# Patient Record
Sex: MALE | Race: BLACK OR AFRICAN AMERICAN | NOT HISPANIC OR LATINO | Employment: OTHER | URBAN - METROPOLITAN AREA
[De-identification: names, ages, dates, MRNs, and addresses within clinical notes are randomized per-mention and may not be internally consistent; named-entity substitution may affect disease eponyms.]

---

## 2023-04-05 ENCOUNTER — NURSING HOME VISIT (OUTPATIENT)
Dept: POST ACUTE CARE | Facility: EXTERNAL LOCATION | Age: 68
End: 2023-04-05
Payer: MEDICARE

## 2023-04-05 DIAGNOSIS — I10 PRIMARY HYPERTENSION: ICD-10-CM

## 2023-04-05 DIAGNOSIS — J42 CHRONIC BRONCHITIS, UNSPECIFIED CHRONIC BRONCHITIS TYPE (MULTI): Primary | ICD-10-CM

## 2023-04-05 DIAGNOSIS — F25.9 SCHIZO AFFECTIVE SCHIZOPHRENIA (MULTI): ICD-10-CM

## 2023-04-05 DIAGNOSIS — G40.909 NONINTRACTABLE EPILEPSY WITHOUT STATUS EPILEPTICUS, UNSPECIFIED EPILEPSY TYPE (MULTI): ICD-10-CM

## 2023-04-05 PROBLEM — R76.8 HEPATITIS C ANTIBODY POSITIVE IN BLOOD: Status: ACTIVE | Noted: 2023-04-05

## 2023-04-05 PROBLEM — D12.6 ADENOMATOUS COLON POLYP: Status: ACTIVE | Noted: 2023-04-05

## 2023-04-05 PROCEDURE — 99308 SBSQ NF CARE LOW MDM 20: CPT | Performed by: INTERNAL MEDICINE

## 2023-04-05 NOTE — PROGRESS NOTES
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 He is sitting in dining room.  ROS- denies fever/chills, ha/dizziness, chest pain, denies wheezing, abdominal pain, or extremity edema  No recent falls  Objective:  VS:/69 T 97.2 P 75 Wt 205 lbs  Gen NAD, pleasant  Neck: no jvd noted  CV: s1 s2, RRR  Pulmonary: CTA B/L  GI: soft, non tender BS +  Psych/Neuro: alert and oriented, pleasant and cooperative  Assessment/Plan-  Hypertension  -chronic, stable  Copd, chronic, stable  c/w symbicort  c/w albuterol as needed  BPH  Epilepsy- no recent seizure activity  c/w Depakote  continue supportive care  Schizophrenia- stable  c/w Risperdal, divalproex  c/w Haldol prn  F/U with Psych  c/w supportive care

## 2023-04-05 NOTE — LETTER
Patient: Dhruv Elizabeth  : 1955    Encounter Date: 2023    Subjective- Resident seen for routine visit. He is sitting in dining room.  ROS- denies fever/chills, ha/dizziness, chest pain, denies wheezing, abdominal pain, or extremity edema  No recent falls  Objective:  VS:/69 T 97.2 P 75 Wt 205 lbs  Gen NAD, pleasant  Neck: no jvd noted  CV: s1 s2, RRR  Pulmonary: CTA B/L  GI: soft, non tender BS +  Psych/Neuro: alert and oriented, pleasant and cooperative  Assessment/Plan-  Hypertension  -chronic, stable  Copd, chronic, stable  c/w symbicort  c/w albuterol as needed  BPH  Epilepsy- no recent seizure activity  c/w Depakote  continue supportive care  Schizophrenia- stable  c/w Risperdal, divalproex  c/w Haldol prn  F/U with Psych  c/w supportive care      Electronically Signed By: Jesica Villalpando MD   23 11:54 AM

## 2023-05-31 ENCOUNTER — NURSING HOME VISIT (OUTPATIENT)
Dept: POST ACUTE CARE | Facility: EXTERNAL LOCATION | Age: 68
End: 2023-05-31
Payer: MEDICARE

## 2023-05-31 DIAGNOSIS — I10 PRIMARY HYPERTENSION: ICD-10-CM

## 2023-05-31 DIAGNOSIS — F25.9 SCHIZO AFFECTIVE SCHIZOPHRENIA (MULTI): Primary | ICD-10-CM

## 2023-05-31 DIAGNOSIS — G40.802 OTHER EPILEPSY WITHOUT STATUS EPILEPTICUS, NOT INTRACTABLE (MULTI): ICD-10-CM

## 2023-05-31 PROCEDURE — 99307 SBSQ NF CARE SF MDM 10: CPT | Performed by: INTERNAL MEDICINE

## 2023-05-31 NOTE — LETTER
Patient: Dhruv Elizabeth  : 1955    Encounter Date: 2023    Subjective- Resident seen for routine visit. He is sitting in dining room.  ROS- denies fever/chills, ha/dizziness, chest pain, denies wheezing, abdominal pain, or extremity edema  No recent falls  Objective:  VS:/84 T 98.5 P 88 Wt 206.1 lbs  Gen NAD, pleasant  Neck: no jvd noted  CV: s1 s2, RRR  Pulmonary: CTA B/L  GI: soft, non tender BS +  Psych/Neuro: alert and oriented, pleasant and cooperative  Assessment/Plan-  Hypertension  -chronic, stable  Copd, chronic, stable  c/w symbicort  c/w albuterol as needed  BPH  Epilepsy- no recent seizure activity  c/w Depakote  continue supportive care  Schizophrenia- stable  c/w Risperdal, divalproex  c/w Haldol prn  F/U with Psych  c/w supportive care      Electronically Signed By: Jesica Villalpando MD   23  8:43 PM

## 2023-06-01 NOTE — PROGRESS NOTES
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 He is sitting in dining room.  ROS- denies fever/chills, ha/dizziness, chest pain, denies wheezing, abdominal pain, or extremity edema  No recent falls  Objective:  VS:/84 T 98.5 P 88 Wt 206.1 lbs  Gen NAD, pleasant  Neck: no jvd noted  CV: s1 s2, RRR  Pulmonary: CTA B/L  GI: soft, non tender BS +  Psych/Neuro: alert and oriented, pleasant and cooperative  Assessment/Plan-  Hypertension  -chronic, stable  Copd, chronic, stable  c/w symbicort  c/w albuterol as needed  BPH  Epilepsy- no recent seizure activity  c/w Depakote  continue supportive care  Schizophrenia- stable  c/w Risperdal, divalproex  c/w Haldol prn  F/U with Psych  c/w supportive care

## 2023-06-28 ENCOUNTER — NURSING HOME VISIT (OUTPATIENT)
Dept: POST ACUTE CARE | Facility: EXTERNAL LOCATION | Age: 68
End: 2023-06-28
Payer: MEDICARE

## 2023-06-28 DIAGNOSIS — I10 PRIMARY HYPERTENSION: Primary | ICD-10-CM

## 2023-06-28 DIAGNOSIS — F25.9 SCHIZO AFFECTIVE SCHIZOPHRENIA (MULTI): ICD-10-CM

## 2023-06-28 DIAGNOSIS — G40.009 PARTIAL IDIOPATHIC EPILEPSY WITH SEIZURES OF LOCALIZED ONSET, NOT INTRACTABLE, WITHOUT STATUS EPILEPTICUS (MULTI): ICD-10-CM

## 2023-06-28 PROCEDURE — 99308 SBSQ NF CARE LOW MDM 20: CPT | Performed by: INTERNAL MEDICINE

## 2023-06-28 NOTE — LETTER
Patient: Dhruv Elizabeth  : 1955    Encounter Date: 2023    Subjective- Resident seen for routine visit. He is sitting in dining room.  ROS- denies fever/chills, ha/dizziness, chest pain, denies wheezing, abdominal pain, or extremity edema  No recent falls  Objective:  VS:/75 T 98 P 79 Wt 207.1 lbs  Gen NAD, pleasant  Neck: no jvd noted  CV: s1 s2, RRR  Pulmonary: CTA B/L  GI: soft, non tender BS +  Psych/Neuro: alert and oriented, pleasant and cooperative  Assessment/Plan-  Hypertension  -chronic, stable  Copd, chronic, stable  c/w symbicort  c/w albuterol as needed  BPH  Epilepsy- no recent seizure activity  c/w Depakote  continue supportive care  Schizophrenia- stable  c/w Risperdal, divalproex  c/w Haldol prn  F/U with Psych  c/w supportive care      Electronically Signed By: Jesica Villalpando MD   23  8:11 PM

## 2023-06-29 NOTE — PROGRESS NOTES
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 He is sitting in dining room.  ROS- denies fever/chills, ha/dizziness, chest pain, denies wheezing, abdominal pain, or extremity edema  No recent falls  Objective:  VS:/75 T 98 P 79 Wt 207.1 lbs  Gen NAD, pleasant  Neck: no jvd noted  CV: s1 s2, RRR  Pulmonary: CTA B/L  GI: soft, non tender BS +  Psych/Neuro: alert and oriented, pleasant and cooperative  Assessment/Plan-  Hypertension  -chronic, stable  Copd, chronic, stable  c/w symbicort  c/w albuterol as needed  BPH  Epilepsy- no recent seizure activity  c/w Depakote  continue supportive care  Schizophrenia- stable  c/w Risperdal, divalproex  c/w Haldol prn  F/U with Psych  c/w supportive care

## 2023-08-16 ENCOUNTER — NURSING HOME VISIT (OUTPATIENT)
Dept: POST ACUTE CARE | Facility: EXTERNAL LOCATION | Age: 68
End: 2023-08-16
Payer: MEDICARE

## 2023-08-16 DIAGNOSIS — F25.9 SCHIZO AFFECTIVE SCHIZOPHRENIA (MULTI): ICD-10-CM

## 2023-08-16 DIAGNOSIS — N40.0 BENIGN PROSTATIC HYPERPLASIA WITHOUT LOWER URINARY TRACT SYMPTOMS: ICD-10-CM

## 2023-08-16 DIAGNOSIS — I10 PRIMARY HYPERTENSION: Primary | ICD-10-CM

## 2023-08-16 DIAGNOSIS — G40.009 PARTIAL IDIOPATHIC EPILEPSY WITH SEIZURES OF LOCALIZED ONSET, NOT INTRACTABLE, WITHOUT STATUS EPILEPTICUS (MULTI): ICD-10-CM

## 2023-08-16 PROCEDURE — 99308 SBSQ NF CARE LOW MDM 20: CPT | Performed by: INTERNAL MEDICINE

## 2023-08-16 NOTE — LETTER
Patient: Dhruv Elizabeth  : 1955    Encounter Date: 2023    Subjective- Resident seen for routine visit. He is sitting in dining room.  ROS- denies fever/chills, ha/dizziness, chest pain, denies wheezing, abdominal pain, or extremity edema  No recent falls  Objective:  VS:/84 T 97.3 P 77 Wt 207.5 lbs  Gen NAD, pleasant  Neck: no jvd noted  CV: s1 s2, RRR  Pulmonary: CTA B/L  GI: soft, non tender BS +  Psych/Neuro: alert and oriented, pleasant and cooperative    Assessment/Plan-  Hypertension- c/w hctz, hydralazine  Copd, chronic, stable  c/w symbicort  c/w albuterol as needed  BPH  Epilepsy- no recent seizure activity  c/w Depakote  continue supportive care  Schizophrenia- stable  c/w Risperdal, divalproex  c/w Haldol prn  F/U with Psych  c/w supportive care      Electronically Signed By: Jesica Villalpando MD   23 12:08 PM

## 2023-08-17 NOTE — PROGRESS NOTES
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 He is sitting in dining room.  ROS- denies fever/chills, ha/dizziness, chest pain, denies wheezing, abdominal pain, or extremity edema  No recent falls  Objective:  VS:/84 T 97.3 P 77 Wt 207.5 lbs  Gen NAD, pleasant  Neck: no jvd noted  CV: s1 s2, RRR  Pulmonary: CTA B/L  GI: soft, non tender BS +  Psych/Neuro: alert and oriented, pleasant and cooperative    Assessment/Plan-  Hypertension- c/w hctz, hydralazine  Copd, chronic, stable  c/w symbicort  c/w albuterol as needed  BPH  Epilepsy- no recent seizure activity  c/w Depakote  continue supportive care  Schizophrenia- stable  c/w Risperdal, divalproex  c/w Haldol prn  F/U with Psych  c/w supportive care

## 2023-09-20 ENCOUNTER — NURSING HOME VISIT (OUTPATIENT)
Dept: POST ACUTE CARE | Facility: EXTERNAL LOCATION | Age: 68
End: 2023-09-20
Payer: MEDICARE

## 2023-09-20 DIAGNOSIS — I10 PRIMARY HYPERTENSION: Primary | ICD-10-CM

## 2023-09-20 DIAGNOSIS — G40.909 NONINTRACTABLE EPILEPSY WITHOUT STATUS EPILEPTICUS, UNSPECIFIED EPILEPSY TYPE (MULTI): ICD-10-CM

## 2023-09-20 DIAGNOSIS — G40.009 PARTIAL IDIOPATHIC EPILEPSY WITH SEIZURES OF LOCALIZED ONSET, NOT INTRACTABLE, WITHOUT STATUS EPILEPTICUS (MULTI): ICD-10-CM

## 2023-09-20 PROCEDURE — 99308 SBSQ NF CARE LOW MDM 20: CPT | Performed by: INTERNAL MEDICINE

## 2023-09-20 NOTE — PROGRESS NOTES
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 He is sitting in his room.  ROS- denies fever/chills, ha/dizziness, chest pain, denies wheezing, abdominal pain, or extremity edema  No recent falls  Objective:  VS:/76 T 98 P 797 Wt 203.1 lbs  Gen NAD, pleasant  Neck: no jvd noted  CV: s1 s2, RRR  Pulmonary: CTA B/L  GI: soft, non tender BS +  Psych/Neuro: alert and oriented, pleasant and cooperative  Assessment/Plan-  Hypertension- c/w hctz, hydralazine  Copd, chronic, stable  c/w symbicort  c/w albuterol as needed  BPH  Epilepsy- no recent seizure activity  c/w Depakote  continue supportive care  Schizophrenia- stable  c/w Risperdal, divalproex  c/w Haldol prn  F/U with Psych  c/w supportive care

## 2023-09-20 NOTE — LETTER
Patient: Dhruv Elizabeth  : 1955    Encounter Date: 2023    Subjective- Resident seen for routine visit. He is sitting in his room.  ROS- denies fever/chills, ha/dizziness, chest pain, denies wheezing, abdominal pain, or extremity edema  No recent falls  Objective:  VS:/76 T 98 P 797 Wt 203.1 lbs  Gen NAD, pleasant  Neck: no jvd noted  CV: s1 s2, RRR  Pulmonary: CTA B/L  GI: soft, non tender BS +  Psych/Neuro: alert and oriented, pleasant and cooperative  Assessment/Plan-  Hypertension- c/w hctz, hydralazine  Copd, chronic, stable  c/w symbicort  c/w albuterol as needed  BPH  Epilepsy- no recent seizure activity  c/w Depakote  continue supportive care  Schizophrenia- stable  c/w Risperdal, divalproex  c/w Haldol prn  F/U with Psych  c/w supportive care      Electronically Signed By: Jesica Villalpando MD   23 11:16 AM

## 2023-10-25 ENCOUNTER — NURSING HOME VISIT (OUTPATIENT)
Dept: POST ACUTE CARE | Facility: EXTERNAL LOCATION | Age: 68
End: 2023-10-25
Payer: MEDICARE

## 2023-10-25 DIAGNOSIS — I10 PRIMARY HYPERTENSION: Primary | ICD-10-CM

## 2023-10-25 DIAGNOSIS — G40.802 OTHER EPILEPSY WITHOUT STATUS EPILEPTICUS, NOT INTRACTABLE (MULTI): ICD-10-CM

## 2023-10-25 PROCEDURE — 99307 SBSQ NF CARE SF MDM 10: CPT | Performed by: INTERNAL MEDICINE

## 2023-10-25 NOTE — LETTER
Patient: Dhruv Elizabeth  : 1955    Encounter Date: 10/25/2023    Subjective- Resident seen for routine visit. He is sitting in his room.  ROS- denies fever/chills, ha/dizziness, chest pain, denies wheezing, abdominal pain, or extremity edema  No recent falls  Objective:  VS:/74 T 97.4 P 75 Wt 208.1 lbs  Gen NAD, pleasant  Neck: no jvd noted  CV: s1 s2, RRR  Pulmonary: CTA B/L  GI: soft, non tender BS +  Psych/Neuro: alert and oriented, pleasant and cooperative  Assessment/Plan-  Hypertension- c/w hctz, hydralazine  Copd,chronic, stable  c/w symbicort  c/w albuterol as needed  BPH  Epilepsy- no recent seizure activity  c/w Depakote  continue supportive care  Schizophrenia- stable  c/w Risperdal, divalproex  c/w Haldol prn  F/U with Psych  c/w supportive care      Electronically Signed By: Jesica Villalpando MD   10/27/23 11:19 AM

## 2023-10-27 NOTE — PROGRESS NOTES
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 He is sitting in his room.  ROS- denies fever/chills, ha/dizziness, chest pain, denies wheezing, abdominal pain, or extremity edema  No recent falls  Objective:  VS:/74 T 97.4 P 75 Wt 208.1 lbs  Gen NAD, pleasant  Neck: no jvd noted  CV: s1 s2, RRR  Pulmonary: CTA B/L  GI: soft, non tender BS +  Psych/Neuro: alert and oriented, pleasant and cooperative  Assessment/Plan-  Hypertension- c/w hctz, hydralazine  Copd,chronic, stable  c/w symbicort  c/w albuterol as needed  BPH  Epilepsy- no recent seizure activity  c/w Depakote  continue supportive care  Schizophrenia- stable  c/w Risperdal, divalproex  c/w Haldol prn  F/U with Psych  c/w supportive care

## 2023-11-29 ENCOUNTER — NURSING HOME VISIT (OUTPATIENT)
Dept: POST ACUTE CARE | Facility: EXTERNAL LOCATION | Age: 68
End: 2023-11-29
Payer: MEDICARE

## 2023-11-29 DIAGNOSIS — F25.9 SCHIZO AFFECTIVE SCHIZOPHRENIA (MULTI): ICD-10-CM

## 2023-11-29 DIAGNOSIS — I10 PRIMARY HYPERTENSION: Primary | ICD-10-CM

## 2023-11-29 DIAGNOSIS — G40.802 OTHER EPILEPSY WITHOUT STATUS EPILEPTICUS, NOT INTRACTABLE (MULTI): ICD-10-CM

## 2023-11-29 DIAGNOSIS — N40.0 BENIGN PROSTATIC HYPERPLASIA WITHOUT LOWER URINARY TRACT SYMPTOMS: ICD-10-CM

## 2023-11-29 PROCEDURE — 99308 SBSQ NF CARE LOW MDM 20: CPT | Performed by: INTERNAL MEDICINE

## 2023-11-29 ASSESSMENT — ENCOUNTER SYMPTOMS
DEPRESSION: 0
OCCASIONAL FEELINGS OF UNSTEADINESS: 1
LOSS OF SENSATION IN FEET: 0

## 2023-11-29 NOTE — PROGRESS NOTES
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 He is sitting in dining room.  ROS- denies fever/chills, ha/dizziness, chest pain, denies wheezing, abdominal pain, or extremity edema  No recent falls  Objective:  VS:/85 T 97.9 P 75 Wt 198.1 lbs  Gen NAD, pleasant  Neck: no jvd noted  CV: s1 s2, RRR  Pulmonary: CTA B/L  GI: soft, non tender BS +  Psych/Neuro: alert and oriented, pleasant and cooperative  Assessment/Plan-  Hypertension- c/w hctz, hydralazine  Copd,chronic, stable  c/w symbicort  c/w albuterol as needed  BPH  Epilepsy- no recent seizures  c/w Depakote  continue supportive care  Schizophrenia- stable  c/w Risperdal, divalproex  c/w Haldol prn  F/U with Psych  c/w supportive care

## 2023-11-29 NOTE — LETTER
Patient: Dhruv Elizabeth  : 1955    Encounter Date: 2023    Subjective- Resident seen for routine visit. He is sitting in dining room.  ROS- denies fever/chills, ha/dizziness, chest pain, denies wheezing, abdominal pain, or extremity edema  No recent falls  Objective:  VS:/85 T 97.9 P 75 Wt 198.1 lbs  Gen NAD, pleasant  Neck: no jvd noted  CV: s1 s2, RRR  Pulmonary: CTA B/L  GI: soft, non tender BS +  Psych/Neuro: alert and oriented, pleasant and cooperative  Assessment/Plan-  Hypertension- c/w hctz, hydralazine  Copd,chronic, stable  c/w symbicort  c/w albuterol as needed  BPH  Epilepsy- no recent seizures  c/w Depakote  continue supportive care  Schizophrenia- stable  c/w Risperdal, divalproex  c/w Haldol prn  F/U with Psych  c/w supportive care      Electronically Signed By: Jesica Villalpando MD   23  2:35 PM

## 2023-12-27 ENCOUNTER — NURSING HOME VISIT (OUTPATIENT)
Dept: POST ACUTE CARE | Facility: EXTERNAL LOCATION | Age: 68
End: 2023-12-27
Payer: MEDICARE

## 2023-12-27 DIAGNOSIS — I10 PRIMARY HYPERTENSION: Primary | ICD-10-CM

## 2023-12-27 DIAGNOSIS — F25.9 SCHIZO AFFECTIVE SCHIZOPHRENIA (MULTI): ICD-10-CM

## 2023-12-27 DIAGNOSIS — G40.802 OTHER EPILEPSY WITHOUT STATUS EPILEPTICUS, NOT INTRACTABLE (MULTI): ICD-10-CM

## 2023-12-27 PROCEDURE — 99308 SBSQ NF CARE LOW MDM 20: CPT | Performed by: INTERNAL MEDICINE

## 2023-12-27 NOTE — LETTER
Patient: Dhruv Elizabeth  : 1955    Encounter Date: 2023    Subjective- Resident seen for routine visit. He is sitting in dining room.  ROS- denies fever/chills, ha/dizziness, chest pain, denies wheezing, abdominal pain, or extremity edema  No recent falls  Objective:  VS:/80 T 97.9 P 79 Wt 199.1 lbs  Gen NAD, pleasant  Neck: no jvd noted  CV: s1 s2, RRR  Pulmonary: CTA B/L  GI: soft, non tender BS +  Psych/Neuro: alert and oriented, pleasant and cooperative  Assessment/Plan-  Hypertension- c/w hctz, hydralazine  Copd-stable  c/w symbicort  c/w albuterol as needed  BPH  Epilepsy- no recent seizures  c/w Depakote  continue supportive care  Schizophrenia- stable  c/w Risperdal, divalproex  c/w Haldol prn  c/w supportive care      Electronically Signed By: Jesica Villalpando MD   23  7:24 AM

## 2023-12-28 NOTE — PROGRESS NOTES
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 He is sitting in dining room.  ROS- denies fever/chills, ha/dizziness, chest pain, denies wheezing, abdominal pain, or extremity edema  No recent falls  Objective:  VS:/80 T 97.9 P 79 Wt 199.1 lbs  Gen NAD, pleasant  Neck: no jvd noted  CV: s1 s2, RRR  Pulmonary: CTA B/L  GI: soft, non tender BS +  Psych/Neuro: alert and oriented, pleasant and cooperative  Assessment/Plan-  Hypertension- c/w hctz, hydralazine  Copd-stable  c/w symbicort  c/w albuterol as needed  BPH  Epilepsy- no recent seizures  c/w Depakote  continue supportive care  Schizophrenia- stable  c/w Risperdal, divalproex  c/w Haldol prn  c/w supportive care

## 2024-01-31 ENCOUNTER — NURSING HOME VISIT (OUTPATIENT)
Dept: POST ACUTE CARE | Facility: EXTERNAL LOCATION | Age: 69
End: 2024-01-31
Payer: MEDICARE

## 2024-01-31 DIAGNOSIS — F25.9 SCHIZO AFFECTIVE SCHIZOPHRENIA (MULTI): ICD-10-CM

## 2024-01-31 DIAGNOSIS — G40.802 OTHER EPILEPSY WITHOUT STATUS EPILEPTICUS, NOT INTRACTABLE (MULTI): ICD-10-CM

## 2024-01-31 DIAGNOSIS — J42 CHRONIC BRONCHITIS, UNSPECIFIED CHRONIC BRONCHITIS TYPE (MULTI): ICD-10-CM

## 2024-01-31 PROCEDURE — 99308 SBSQ NF CARE LOW MDM 20: CPT | Performed by: INTERNAL MEDICINE

## 2024-01-31 NOTE — LETTER
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 He is sitting in hallway. No concerns per staff. Appetite remains good. no falls.  ROS- denies fever/chills, ha/dizziness, chest pain, denies wheezing, abdominal pain, or extremity edema  No recent falls  Objective:  VS:/78 T 97.7 P 80 Wt 202.4 lbs  Gen NAD, pleasant  Neck: no jvd noted  CV: s1 s2, RRR  Pulmonary: CTA B/L  GI: soft, non tender BS +  Psych/Neuro: alert and oriented, pleasant and cooperative  Assessment/Plan-  Hypertension- c/w hctz, hydralazine  Copd-stable  c/w symbicort  c/w albuterol as needed  Epilepsy- no recent seizures  c/w Depakote  continue supportive care  Schizophrenia- stable  c/w Risperdal, divalproex  c/w Haldol prn  c/w supportive care  Weakness- PT/OT      Electronically Signed By: Jesica Villalpando MD   24 12:34 PM

## 2024-02-01 NOTE — PROGRESS NOTES
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 He is sitting in hallway. No concerns per staff. Appetite remains good. no falls.  ROS- denies fever/chills, ha/dizziness, chest pain, denies wheezing, abdominal pain, or extremity edema  No recent falls  Objective:  VS:/78 T 97.7 P 80 Wt 202.4 lbs  Gen NAD, pleasant  Neck: no jvd noted  CV: s1 s2, RRR  Pulmonary: CTA B/L  GI: soft, non tender BS +  Psych/Neuro: alert and oriented, pleasant and cooperative  Assessment/Plan-  Hypertension- c/w hctz, hydralazine  Copd-stable  c/w symbicort  c/w albuterol as needed  Epilepsy- no recent seizures  c/w Depakote  continue supportive care  Schizophrenia- stable  c/w Risperdal, divalproex  c/w Haldol prn  c/w supportive care  Weakness- PT/OT

## 2024-03-29 ENCOUNTER — NURSING HOME VISIT (OUTPATIENT)
Dept: POST ACUTE CARE | Facility: EXTERNAL LOCATION | Age: 69
End: 2024-03-29
Payer: MEDICARE

## 2024-03-29 DIAGNOSIS — J42 CHRONIC BRONCHITIS, UNSPECIFIED CHRONIC BRONCHITIS TYPE (MULTI): ICD-10-CM

## 2024-03-29 DIAGNOSIS — F25.9 SCHIZO AFFECTIVE SCHIZOPHRENIA (MULTI): Primary | ICD-10-CM

## 2024-03-29 DIAGNOSIS — I10 PRIMARY HYPERTENSION: ICD-10-CM

## 2024-03-29 PROCEDURE — 99308 SBSQ NF CARE LOW MDM 20: CPT | Performed by: INTERNAL MEDICINE

## 2024-03-29 NOTE — LETTER
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 He is sitting in dining room. No concerns per staff. Appetite remains good. no falls.  ROS- denies fever/chills, ha/dizziness, chest pain, denies wheezing, abdominal pain, or extremity edema  No recent falls  Objective:  VS:/66 T 98.1 P 103 Wt 197.4 lbs  Gen NAD, pleasant  Neck: no jvd noted  CV: s1 s2, RRR  Pulmonary: CTA B/L  GI: soft, non tender BS +  Psych/Neuro: alert and oriented, pleasant and cooperative  Assessment/Plan-  Hypertension- c/w hctz, hydralazine  Copd-stable  c/w symbicort  c/w albuterol as needed  Epilepsy- no recent seizures  c/w Depakote  continue supportive care  Schizophrenia- stable  c/w Risperdal, divalproex  c/w Haldol prn  c/w supportive care  Weakness- fall precautions      Electronically Signed By: Jesica Villalpando MD   3/29/24  8:43 PM

## 2024-03-30 NOTE — PROGRESS NOTES
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 He is sitting in dining room. No concerns per staff. Appetite remains good. no falls.  ROS- denies fever/chills, ha/dizziness, chest pain, denies wheezing, abdominal pain, or extremity edema  No recent falls  Objective:  VS:/66 T 98.1 P 103 Wt 197.4 lbs  Gen NAD, pleasant  Neck: no jvd noted  CV: s1 s2, RRR  Pulmonary: CTA B/L  GI: soft, non tender BS +  Psych/Neuro: alert and oriented, pleasant and cooperative  Assessment/Plan-  Hypertension- c/w hctz, hydralazine  Copd-stable  c/w symbicort  c/w albuterol as needed  Epilepsy- no recent seizures  c/w Depakote  continue supportive care  Schizophrenia- stable  c/w Risperdal, divalproex  c/w Haldol prn  c/w supportive care  Weakness- fall precautions

## 2024-04-17 ENCOUNTER — NURSING HOME VISIT (OUTPATIENT)
Dept: POST ACUTE CARE | Facility: EXTERNAL LOCATION | Age: 69
End: 2024-04-17
Payer: MEDICARE

## 2024-04-17 DIAGNOSIS — G40.802 OTHER EPILEPSY WITHOUT STATUS EPILEPTICUS, NOT INTRACTABLE (MULTI): ICD-10-CM

## 2024-04-17 DIAGNOSIS — I10 PRIMARY HYPERTENSION: Primary | ICD-10-CM

## 2024-04-17 DIAGNOSIS — F25.9 SCHIZO AFFECTIVE SCHIZOPHRENIA (MULTI): ICD-10-CM

## 2024-04-17 PROCEDURE — 99309 SBSQ NF CARE MODERATE MDM 30: CPT | Performed by: INTERNAL MEDICINE

## 2024-04-17 NOTE — LETTER
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 He is sitting in his room today. No concerns per staff. Appetite remains good. no falls.  ROS- denies fever/chills, ha/dizziness, chest pain, denies wheezing, abdominal pain, or extremity edema  No recent falls  Objective:  VS:/66 T 98.1 P 103 Wt 199.5 lbs  Gen NAD, pleasant  Neck: no jvd noted  CV: s1 s2, RRR  Pulmonary: CTA B/L  GI: soft, non tender BS +  Psych/Neuro: alert and oriented, pleasant and cooperative  Assessment/Plan-  Hypertension- c/w hctz, hydralazine  Copd-stable  c/w symbicort  c/w albuterol as needed  Epilepsy- no recent seizures  c/w Depakote  continue supportive care  Schizophrenia- stable  c/w Risperdal, divalproex  c/w Haldol prn  c/w supportive care  Weakness- fall precautions  Guardianship papers completed      Electronically Signed By: Jesica Villalpando MD   24  9:04 AM

## 2024-04-19 NOTE — PROGRESS NOTES
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 He is sitting in his room today. No concerns per staff. Appetite remains good. no falls.  ROS- denies fever/chills, ha/dizziness, chest pain, denies wheezing, abdominal pain, or extremity edema  No recent falls  Objective:  VS:/66 T 98.1 P 103 Wt 199.5 lbs  Gen NAD, pleasant  Neck: no jvd noted  CV: s1 s2, RRR  Pulmonary: CTA B/L  GI: soft, non tender BS +  Psych/Neuro: alert and oriented, pleasant and cooperative  Assessment/Plan-  Hypertension- c/w hctz, hydralazine  Copd-stable  c/w symbicort  c/w albuterol as needed  Epilepsy- no recent seizures  c/w Depakote  continue supportive care  Schizophrenia- stable  c/w Risperdal, divalproex  c/w Haldol prn  c/w supportive care  Weakness- fall precautions  Guardianship papers completed

## 2024-05-24 ENCOUNTER — NURSING HOME VISIT (OUTPATIENT)
Dept: POST ACUTE CARE | Facility: EXTERNAL LOCATION | Age: 69
End: 2024-05-24
Payer: MEDICARE

## 2024-05-24 DIAGNOSIS — G40.802 OTHER EPILEPSY WITHOUT STATUS EPILEPTICUS, NOT INTRACTABLE (MULTI): ICD-10-CM

## 2024-05-24 DIAGNOSIS — I10 PRIMARY HYPERTENSION: Primary | ICD-10-CM

## 2024-05-24 DIAGNOSIS — F25.9 SCHIZO AFFECTIVE SCHIZOPHRENIA (MULTI): ICD-10-CM

## 2024-05-24 PROCEDURE — 99308 SBSQ NF CARE LOW MDM 20: CPT | Performed by: INTERNAL MEDICINE

## 2024-05-24 NOTE — LETTER
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 He is sitting in dining room . No concerns per staff. Appetite remains good. no falls.  ROS- denies fever/chills, ha/dizziness, chest pain, denies wheezing, abdominal pain, or extremity edema  No recent falls  Objective:  VS:/76 T 97.5 P 78 Wt 201.6 lbs  Gen NAD, pleasant  Neck: no jvd noted  CV: s1 s2, RRR  Pulmonary: CTA B/L  GI: soft, non tender BS +  Psych/Neuro: alert and oriented, pleasant and cooperative  Assessment/Plan-  Hypertension- c/w hctz, hydralazine  Copd-stable  c/w symbicort  c/w albuterol as needed  Epilepsy- no recent seizures  c/w Depakote  continue supportive care  Schizophrenia- stable  c/w Risperdal, divalproex  c/w Haldol prn  c/w supportive care  Weakness- fall precautions      Electronically Signed By: Jesica Villalpando MD   24  5:20 PM

## 2024-05-24 NOTE — PROGRESS NOTES
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 He is sitting in dining room . No concerns per staff. Appetite remains good. no falls.  ROS- denies fever/chills, ha/dizziness, chest pain, denies wheezing, abdominal pain, or extremity edema  No recent falls  Objective:  VS:/76 T 97.5 P 78 Wt 201.6 lbs  Gen NAD, pleasant  Neck: no jvd noted  CV: s1 s2, RRR  Pulmonary: CTA B/L  GI: soft, non tender BS +  Psych/Neuro: alert and oriented, pleasant and cooperative  Assessment/Plan-  Hypertension- c/w hctz, hydralazine  Copd-stable  c/w symbicort  c/w albuterol as needed  Epilepsy- no recent seizures  c/w Depakote  continue supportive care  Schizophrenia- stable  c/w Risperdal, divalproex  c/w Haldol prn  c/w supportive care  Weakness- fall precautions

## 2024-06-19 ENCOUNTER — NURSING HOME VISIT (OUTPATIENT)
Dept: POST ACUTE CARE | Facility: EXTERNAL LOCATION | Age: 69
End: 2024-06-19
Payer: MEDICARE

## 2024-06-19 DIAGNOSIS — G40.802 OTHER EPILEPSY WITHOUT STATUS EPILEPTICUS, NOT INTRACTABLE (MULTI): ICD-10-CM

## 2024-06-19 DIAGNOSIS — I10 PRIMARY HYPERTENSION: Primary | ICD-10-CM

## 2024-06-19 DIAGNOSIS — F25.9 SCHIZO AFFECTIVE SCHIZOPHRENIA (MULTI): ICD-10-CM

## 2024-06-19 PROCEDURE — 99308 SBSQ NF CARE LOW MDM 20: CPT | Performed by: INTERNAL MEDICINE

## 2024-06-19 NOTE — LETTER
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 He is sitting in his room . No concerns per staff. Appetite remains good. no falls.  ROS- denies fever/chills, ha/dizziness, chest pain, denies wheezing, abdominal pain, or extremity edema  No recent falls  Objective:  VS:/67 T 97.1 P 90 Wt 198.8 lbs  Gen NAD, pleasant  Neck: no jvd noted  CV: s1 s2, RRR  Pulmonary: CTA B/L  GI: soft, non tender BS +  Psych/Neuro: alert and oriented, pleasant and cooperative  Assessment/Plan-  Hypertension- c/w hctz, hydralazine  Copd-stable  c/w symbicort  c/w albuterol as needed  Epilepsy- no recent seizures  c/w Depakote  continue supportive care  Schizophrenia- stable  c/w Risperdal, divalproex  c/w Haldol prn  c/w supportive care  Weakness- fall precautions      Electronically Signed By: Jesica Villalpando MD   24  1:27 PM

## 2024-06-20 NOTE — PROGRESS NOTES
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 He is sitting in his room . No concerns per staff. Appetite remains good. no falls.  ROS- denies fever/chills, ha/dizziness, chest pain, denies wheezing, abdominal pain, or extremity edema  No recent falls  Objective:  VS:/67 T 97.1 P 90 Wt 198.8 lbs  Gen NAD, pleasant  Neck: no jvd noted  CV: s1 s2, RRR  Pulmonary: CTA B/L  GI: soft, non tender BS +  Psych/Neuro: alert and oriented, pleasant and cooperative  Assessment/Plan-  Hypertension- c/w hctz, hydralazine  Copd-stable  c/w symbicort  c/w albuterol as needed  Epilepsy- no recent seizures  c/w Depakote  continue supportive care  Schizophrenia- stable  c/w Risperdal, divalproex  c/w Haldol prn  c/w supportive care  Weakness- fall precautions

## 2024-07-24 ENCOUNTER — NURSING HOME VISIT (OUTPATIENT)
Dept: POST ACUTE CARE | Facility: EXTERNAL LOCATION | Age: 69
End: 2024-07-24
Payer: MEDICARE

## 2024-07-24 DIAGNOSIS — G40.802 OTHER EPILEPSY WITHOUT STATUS EPILEPTICUS, NOT INTRACTABLE (MULTI): ICD-10-CM

## 2024-07-24 DIAGNOSIS — I10 PRIMARY HYPERTENSION: Primary | ICD-10-CM

## 2024-07-24 DIAGNOSIS — F25.9 SCHIZO AFFECTIVE SCHIZOPHRENIA (MULTI): ICD-10-CM

## 2024-07-24 PROCEDURE — 99308 SBSQ NF CARE LOW MDM 20: CPT | Performed by: INTERNAL MEDICINE

## 2024-07-24 NOTE — PROGRESS NOTES
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 He is sitting in his room . No concerns per staff. Appetite remains good. no falls.  ROS- denies fever/chills, ha/dizziness, chest pain, denies wheezing, abdominal pain, or extremity edema  No recent falls  Objective:  VS:/72 T 98.6 P 88 Wt 201.4 lbs  Gen NAD, pleasant  Neck: no jvd noted  CV: s1 s2, RRR  Pulmonary: CTA B/L  GI: soft, non tender BS +  Psych/Neuro: alert and oriented, pleasant and cooperative  Assessment/Plan-  Hypertension- c/w hctz, hydralazine  Copd-stable  c/w symbicort  c/w albuterol as needed  Epilepsy- no recent seizures  c/w Depakote  continue supportive care  Schizophrenia- stable  c/w Risperdal, divalproex  c/w Haldol prn  c/w supportive care  Weakness- fall precautions

## 2024-07-24 NOTE — LETTER
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 He is sitting in his room . No concerns per staff. Appetite remains good. no falls.  ROS- denies fever/chills, ha/dizziness, chest pain, denies wheezing, abdominal pain, or extremity edema  No recent falls  Objective:  VS:/72 T 98.6 P 88 Wt 201.4 lbs  Gen NAD, pleasant  Neck: no jvd noted  CV: s1 s2, RRR  Pulmonary: CTA B/L  GI: soft, non tender BS +  Psych/Neuro: alert and oriented, pleasant and cooperative  Assessment/Plan-  Hypertension- c/w hctz, hydralazine  Copd-stable  c/w symbicort  c/w albuterol as needed  Epilepsy- no recent seizures  c/w Depakote  continue supportive care  Schizophrenia- stable  c/w Risperdal, divalproex  c/w Haldol prn  c/w supportive care  Weakness- fall precautions      Electronically Signed By: Jesica Villalpando MD   24  4:32 PM

## 2024-08-28 ENCOUNTER — NURSING HOME VISIT (OUTPATIENT)
Dept: PRIMARY CARE | Facility: CLINIC | Age: 69
End: 2024-08-28
Payer: MEDICARE

## 2024-08-28 DIAGNOSIS — I10 PRIMARY HYPERTENSION: ICD-10-CM

## 2024-08-28 DIAGNOSIS — G40.822 NONINTRACTABLE EPILEPTIC SPASMS WITHOUT STATUS EPILEPTICUS (MULTI): ICD-10-CM

## 2024-08-28 DIAGNOSIS — F25.9 SCHIZO AFFECTIVE SCHIZOPHRENIA (MULTI): Primary | ICD-10-CM

## 2024-08-28 DIAGNOSIS — J41.0 SIMPLE CHRONIC BRONCHITIS (MULTI): ICD-10-CM

## 2024-08-29 NOTE — PROGRESS NOTES
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 He is sitting in his room . No concerns per staff. Appetite remains good. no falls.  ROS- denies fever/chills, ha/dizziness, chest pain, denies wheezing, abdominal pain, or extremity edema  No recent falls  Objective:  VS:/75 T 97.4 P 82 Wt 203.8 lbs  Gen NAD, pleasant  Neck: no jvd noted  CV: s1 s2, RRR  Pulmonary: CTA B/L  GI: soft, non tender BS +  Psych/Neuro: alert and oriented, pleasant and cooperative  Assessment/Plan-  Hypertension- c/w hctz, hydralazine  Copd-stable  c/w symbicort  c/w albuterol as needed  Epilepsy- no recent seizures  c/w Depakote  continue supportive care  Schizophrenia- stable  c/w Risperdal, divalproex  c/w Haldol prn  c/w supportive care  Weakness- fall precautions  Labs this week

## 2024-09-25 ENCOUNTER — NURSING HOME VISIT (OUTPATIENT)
Dept: POST ACUTE CARE | Facility: EXTERNAL LOCATION | Age: 69
End: 2024-09-25
Payer: MEDICARE

## 2024-09-25 DIAGNOSIS — F25.9 SCHIZO AFFECTIVE SCHIZOPHRENIA (MULTI): ICD-10-CM

## 2024-09-25 DIAGNOSIS — I10 PRIMARY HYPERTENSION: Primary | ICD-10-CM

## 2024-09-25 DIAGNOSIS — G40.802 OTHER EPILEPSY WITHOUT STATUS EPILEPTICUS, NOT INTRACTABLE: ICD-10-CM

## 2024-09-25 DIAGNOSIS — G40.009 PARTIAL IDIOPATHIC EPILEPSY WITH SEIZURES OF LOCALIZED ONSET, NOT INTRACTABLE, WITHOUT STATUS EPILEPTICUS (MULTI): ICD-10-CM

## 2024-09-25 PROCEDURE — 99308 SBSQ NF CARE LOW MDM 20: CPT | Performed by: INTERNAL MEDICINE

## 2024-09-25 NOTE — PROGRESS NOTES
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 He is sitting in his room . No concerns per staff. Appetite remains good. no falls.  ROS- denies fever/chills, ha/dizziness, chest pain, denies wheezing, abdominal pain, or extremity edema  No recent falls  Objective:  VS:/72 T 97.9 P 85 Wt 199.4 lbs  Gen NAD, pleasant  Neck: no jvd noted  CV: s1 s2, RRR  Pulmonary: CTA B/L  GI: soft, non tender BS +  Psych/Neuro: alert and oriented, pleasant and cooperative  Assessment/Plan-  Hypertension- c/w hctz, hydralazine  Copd-stable  c/w symbicort  c/w albuterol as needed  Epilepsy- no recent seizures  c/w Depakote  continue supportive care  Schizophrenia- stable  c/w Risperdal, divalproex  c/w Haldol prn  c/w supportive care  Weakness- fall precautions

## 2024-09-25 NOTE — LETTER
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 He is sitting in his room . No concerns per staff. Appetite remains good. no falls.  ROS- denies fever/chills, ha/dizziness, chest pain, denies wheezing, abdominal pain, or extremity edema  No recent falls  Objective:  VS:/72 T 97.9 P 85 Wt 199.4 lbs  Gen NAD, pleasant  Neck: no jvd noted  CV: s1 s2, RRR  Pulmonary: CTA B/L  GI: soft, non tender BS +  Psych/Neuro: alert and oriented, pleasant and cooperative  Assessment/Plan-  Hypertension- c/w hctz, hydralazine  Copd-stable  c/w symbicort  c/w albuterol as needed  Epilepsy- no recent seizures  c/w Depakote  continue supportive care  Schizophrenia- stable  c/w Risperdal, divalproex  c/w Haldol prn  c/w supportive care  Weakness- fall precautions      Electronically Signed By: Jesica Villalpando MD   24  5:45 PM

## 2024-10-30 ENCOUNTER — NURSING HOME VISIT (OUTPATIENT)
Dept: POST ACUTE CARE | Facility: EXTERNAL LOCATION | Age: 69
End: 2024-10-30
Payer: MEDICARE

## 2024-10-30 DIAGNOSIS — I10 PRIMARY HYPERTENSION: Primary | ICD-10-CM

## 2024-10-30 DIAGNOSIS — G40.802 OTHER EPILEPSY WITHOUT STATUS EPILEPTICUS, NOT INTRACTABLE: ICD-10-CM

## 2024-10-30 DIAGNOSIS — F25.9 SCHIZO AFFECTIVE SCHIZOPHRENIA (MULTI): ICD-10-CM

## 2024-10-30 PROCEDURE — 99308 SBSQ NF CARE LOW MDM 20: CPT | Performed by: INTERNAL MEDICINE

## 2024-11-27 ENCOUNTER — NURSING HOME VISIT (OUTPATIENT)
Dept: POST ACUTE CARE | Facility: EXTERNAL LOCATION | Age: 69
End: 2024-11-27
Payer: MEDICARE

## 2024-11-27 DIAGNOSIS — N40.0 BENIGN PROSTATIC HYPERPLASIA WITHOUT LOWER URINARY TRACT SYMPTOMS: ICD-10-CM

## 2024-11-27 DIAGNOSIS — G40.802 OTHER EPILEPSY WITHOUT STATUS EPILEPTICUS, NOT INTRACTABLE: ICD-10-CM

## 2024-11-27 DIAGNOSIS — F25.9 SCHIZO AFFECTIVE SCHIZOPHRENIA (MULTI): ICD-10-CM

## 2024-11-27 DIAGNOSIS — I10 PRIMARY HYPERTENSION: Primary | ICD-10-CM

## 2024-11-27 PROCEDURE — 99308 SBSQ NF CARE LOW MDM 20: CPT | Performed by: INTERNAL MEDICINE

## 2024-11-27 NOTE — LETTER
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 He is sitting in his room. No concerns per staff. Appetite remains good. no falls.  ROS- denies fever/chills, ha/dizziness, chest pain, denies wheezing, abdominal pain, orextremity edema  No recent falls  Objective:  VS:/62 T 97.6 P 72 Wt 200.6 lbs  Gen NAD, pleasant  Neck: no jvd noted  CV: s1 s2, RRR  Pulmonary: CTA B/L  GI: soft, non tender BS +  Psych/Neuro: alert and oriented, pleasant and cooperative  Assessment/Plan-  Hypertension- c/w hctz, hydralazine  Copd-stable  c/w symbicort  c/w albuterol as needed  Epilepsy- no recent seizures  c/w Depakote  continue supportive care  Schizophrenia- stable  c/w Risperdal, divalproex  c/w Haldol prn  c/w supportive care  Weakness- fall precautions      Electronically Signed By: Jesica Villalpando MD   24  9:37 PM

## 2024-11-28 NOTE — PROGRESS NOTES
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 He is sitting in his room. No concerns per staff. Appetite remains good. no falls.  ROS- denies fever/chills, ha/dizziness, chest pain, denies wheezing, abdominal pain, orextremity edema  No recent falls  Objective:  VS:/62 T 97.6 P 72 Wt 200.6 lbs  Gen NAD, pleasant  Neck: no jvd noted  CV: s1 s2, RRR  Pulmonary: CTA B/L  GI: soft, non tender BS +  Psych/Neuro: alert and oriented, pleasant and cooperative  Assessment/Plan-  Hypertension- c/w hctz, hydralazine  Copd-stable  c/w symbicort  c/w albuterol as needed  Epilepsy- no recent seizures  c/w Depakote  continue supportive care  Schizophrenia- stable  c/w Risperdal, divalproex  c/w Haldol prn  c/w supportive care  Weakness- fall precautions

## 2024-12-27 ENCOUNTER — NURSING HOME VISIT (OUTPATIENT)
Dept: POST ACUTE CARE | Facility: EXTERNAL LOCATION | Age: 69
End: 2024-12-27
Payer: MEDICARE

## 2024-12-27 DIAGNOSIS — F25.9 SCHIZO AFFECTIVE SCHIZOPHRENIA (MULTI): ICD-10-CM

## 2024-12-27 DIAGNOSIS — G40.802 OTHER EPILEPSY WITHOUT STATUS EPILEPTICUS, NOT INTRACTABLE: ICD-10-CM

## 2024-12-27 DIAGNOSIS — I10 PRIMARY HYPERTENSION: Primary | ICD-10-CM

## 2024-12-27 PROCEDURE — 99308 SBSQ NF CARE LOW MDM 20: CPT | Performed by: INTERNAL MEDICINE

## 2024-12-27 ASSESSMENT — ENCOUNTER SYMPTOMS
LOSS OF SENSATION IN FEET: 0
DEPRESSION: 0
OCCASIONAL FEELINGS OF UNSTEADINESS: 0

## 2024-12-27 NOTE — LETTER
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 He is sitting in his room. No concerns per staff. Appetite remains good. no falls.  ROS- denies fever/chills, ha/dizziness, chest pain, denies wheezing, abdominal pain, or extremity edema  No recent falls  Objective:  VS:/80 T 97 P 82 Wt 201.6 lbs  Gen NAD, pleasant  Neck: no jvd noted  CV: s1 s2, RRR  Pulmonary: CTA B/L  GI: soft, non tender BS +  Psych/Neuro: alert and oriented, pleasant and cooperative  Assessment/Plan-  Hypertension- stable  c/w hctz, hydralazine  Copd-stable  c/w symbicort  c/w albuterol as needed  Epilepsy- no recent seizures  c/w Depakote  continue supportive care  Schizophrenia- stable  c/w Risperdal, divalproex  c/w Haldol prn  c/w supportive care  Weakness- s/p PT  fall precautions      Electronically Signed By: Jesica Villalpando MD   24  2:27 PM

## 2024-12-27 NOTE — PROGRESS NOTES
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 He is sitting in his room. No concerns per staff. Appetite remains good. no falls.  ROS- denies fever/chills, ha/dizziness, chest pain, denies wheezing, abdominal pain, or extremity edema  No recent falls  Objective:  VS:/80 T 97 P 82 Wt 201.6 lbs  Gen NAD, pleasant  Neck: no jvd noted  CV: s1 s2, RRR  Pulmonary: CTA B/L  GI: soft, non tender BS +  Psych/Neuro: alert and oriented, pleasant and cooperative  Assessment/Plan-  Hypertension- stable  c/w hctz, hydralazine  Copd-stable  c/w symbicort  c/w albuterol as needed  Epilepsy- no recent seizures  c/w Depakote  continue supportive care  Schizophrenia- stable  c/w Risperdal, divalproex  c/w Haldol prn  c/w supportive care  Weakness- s/p PT  fall precautions

## 2025-01-29 ENCOUNTER — NURSING HOME VISIT (OUTPATIENT)
Dept: POST ACUTE CARE | Facility: EXTERNAL LOCATION | Age: 70
End: 2025-01-29
Payer: MEDICARE

## 2025-01-29 DIAGNOSIS — G40.909 NONINTRACTABLE EPILEPSY WITHOUT STATUS EPILEPTICUS, UNSPECIFIED EPILEPSY TYPE (MULTI): ICD-10-CM

## 2025-01-29 DIAGNOSIS — I10 PRIMARY HYPERTENSION: Primary | ICD-10-CM

## 2025-01-29 DIAGNOSIS — J42 CHRONIC BRONCHITIS, UNSPECIFIED CHRONIC BRONCHITIS TYPE (MULTI): ICD-10-CM

## 2025-01-29 DIAGNOSIS — F25.9 SCHIZO AFFECTIVE SCHIZOPHRENIA (MULTI): ICD-10-CM

## 2025-01-29 PROCEDURE — 99308 SBSQ NF CARE LOW MDM 20: CPT | Performed by: INTERNAL MEDICINE

## 2025-01-29 NOTE — LETTER
Patient: Dhruv Elizabeth  : 1955    Encounter Date: 2025    Subjective- Resident seen for routine visit. He is sitting in his room. No concerns per staff. Appetite remains good. no falls.  ROS- denies fever/chills, ha/dizziness, chest pain, denies wheezing, abdominal pain, or extremity edema  No recent falls  Objective:  VS:/59 T 97.5 P 82 Wt 201.6 lbs  Gen NAD, pleasant  Neck: no jvd noted  CV: s1 s2, RRR  Pulmonary: CTA B/L  GI: soft, non tender BS +  Psych/Neuro: alert and oriented, pleasant and cooperative    Assessment/Plan-  Hypertension- stable  c/w hctz, hydralazine  Copd-stable  c/w symbicort  c/w albuterol as needed  Epilepsy- no recent seizures  c/w Depakote- level 76  continue supportive care  Schizophrenia- stable  c/w Risperdal, divalproex  c/w Haldol prn  c/w supportive care  Weakness- s/p PT  fall precautions      Electronically Signed By: Jesica Villalpando MD   25 12:17 PM

## 2025-01-31 NOTE — PROGRESS NOTES
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 He is sitting in his room. No concerns per staff. Appetite remains good. no falls.  ROS- denies fever/chills, ha/dizziness, chest pain, denies wheezing, abdominal pain, or extremity edema  No recent falls  Objective:  VS:/59 T 97.5 P 82 Wt 201.6 lbs  Gen NAD, pleasant  Neck: no jvd noted  CV: s1 s2, RRR  Pulmonary: CTA B/L  GI: soft, non tender BS +  Psych/Neuro: alert and oriented, pleasant and cooperative    Assessment/Plan-  Hypertension- stable  c/w hctz, hydralazine  Copd-stable  c/w symbicort  c/w albuterol as needed  Epilepsy- no recent seizures  c/w Depakote- level 76  continue supportive care  Schizophrenia- stable  c/w Risperdal, divalproex  c/w Haldol prn  c/w supportive care  Weakness- s/p PT  fall precautions

## 2025-02-26 ENCOUNTER — NURSING HOME VISIT (OUTPATIENT)
Dept: POST ACUTE CARE | Facility: EXTERNAL LOCATION | Age: 70
End: 2025-02-26
Payer: MEDICARE

## 2025-02-26 DIAGNOSIS — G40.909 NONINTRACTABLE EPILEPSY WITHOUT STATUS EPILEPTICUS, UNSPECIFIED EPILEPSY TYPE (MULTI): ICD-10-CM

## 2025-02-26 DIAGNOSIS — F25.9 SCHIZO AFFECTIVE SCHIZOPHRENIA (MULTI): Primary | ICD-10-CM

## 2025-02-26 DIAGNOSIS — I10 PRIMARY HYPERTENSION: ICD-10-CM

## 2025-02-26 PROCEDURE — 99308 SBSQ NF CARE LOW MDM 20: CPT | Performed by: INTERNAL MEDICINE

## 2025-02-26 NOTE — LETTER
Patient: Dhruv Elizabeth  : 1955    Encounter Date: 2025    Subjective- Resident seen for routine visit. He is sitting in his room. No concerns per staff. Appetite remains good. no falls.  ROS- denies fever/chills, ha/dizziness, chest pain, denies wheezing, abdominal pain, orextremity edema  No recent falls  Objective:  VS:/85 T 97.7 P 85 Wt 200.6 lbs  Gen NAD, pleasant  Neck: no jvd noted  CV: s1 s2, RRR  Pulmonary: CTA B/L  GI: soft, non tender BS +  Psych/Neuro: alert and oriented, pleasant and cooperative  Assessment/Plan-  Hypertension- stable  c/w hctz, hydralazine  Copd-stable  c/w symbicort  c/w albuterol as needed  Epilepsy- no recent seizures  continue supportive care  Schizophrenia- stable  c/w Risperdal, divalproex  c/w Haldol prn  c/w supportive care  Weakness- Fall precautions  fall precautions      Electronically Signed By: Jesica Villalpando MD   25  3:03 PM

## 2025-02-27 NOTE — PROGRESS NOTES
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 He is sitting in his room. No concerns per staff. Appetite remains good. no falls.  ROS- denies fever/chills, ha/dizziness, chest pain, denies wheezing, abdominal pain, orextremity edema  No recent falls  Objective:  VS:/85 T 97.7 P 85 Wt 200.6 lbs  Gen NAD, pleasant  Neck: no jvd noted  CV: s1 s2, RRR  Pulmonary: CTA B/L  GI: soft, non tender BS +  Psych/Neuro: alert and oriented, pleasant and cooperative  Assessment/Plan-  Hypertension- stable  c/w hctz, hydralazine  Copd-stable  c/w symbicort  c/w albuterol as needed  Epilepsy- no recent seizures  continue supportive care  Schizophrenia- stable  c/w Risperdal, divalproex  c/w Haldol prn  c/w supportive care  Weakness- Fall precautions  fall precautions

## 2025-04-30 ENCOUNTER — NURSING HOME VISIT (OUTPATIENT)
Dept: POST ACUTE CARE | Facility: EXTERNAL LOCATION | Age: 70
End: 2025-04-30
Payer: MEDICARE

## 2025-04-30 DIAGNOSIS — J42 CHRONIC BRONCHITIS, UNSPECIFIED CHRONIC BRONCHITIS TYPE (MULTI): ICD-10-CM

## 2025-04-30 DIAGNOSIS — G40.909 NONINTRACTABLE EPILEPSY WITHOUT STATUS EPILEPTICUS, UNSPECIFIED EPILEPSY TYPE (MULTI): ICD-10-CM

## 2025-04-30 DIAGNOSIS — F25.9 SCHIZO AFFECTIVE SCHIZOPHRENIA (MULTI): Primary | ICD-10-CM

## 2025-04-30 DIAGNOSIS — I10 PRIMARY HYPERTENSION: ICD-10-CM

## 2025-04-30 PROCEDURE — 99308 SBSQ NF CARE LOW MDM 20: CPT | Performed by: INTERNAL MEDICINE

## 2025-04-30 NOTE — LETTER
Patient: Dhruv Elizabeth  : 1955    Encounter Date: 2025    Subjective- Resident seen for routine visit. He is sitting in his room. No concerns per staff. Appetite remains good. no falls.  ROS- denies fever/chills, ha/dizziness, chest pain, denies wheezing, abdominal pain, or swelling  No recent falls  Objective:  VS:/92 T 97.3 P 70 Wt 197.6 lbs  Gen NAD, pleasant  Neck: no jvd noted  CV: s1 s2, RRR  Pulmonary: CTA B/L  GI: soft, non tender BS +  Psych/Neuro: alert and oriented, pleasant and cooperative    Assessment/Plan-  Hypertension- stable  c/w hctz, hydralazine  Copd-stable  c/w symbicort  c/w albuterol as needed  Epilepsy- no recent seizures  continue supportive care  Schizophrenia- stable  c/w Risperdal, divalproex  c/w Haldol prn  c/w supportive care  Weakness- Fall precautions    Electronically Signed By: Jesica Villalpando MD   25  5:02 PM

## 2025-04-30 NOTE — PROGRESS NOTES
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 He is sitting in his room. No concerns per staff. Appetite remains good. no falls.  ROS- denies fever/chills, ha/dizziness, chest pain, denies wheezing, abdominal pain, or swelling  No recent falls  Objective:  VS:/92 T 97.3 P 70 Wt 197.6 lbs  Gen NAD, pleasant  Neck: no jvd noted  CV: s1 s2, RRR  Pulmonary: CTA B/L  GI: soft, non tender BS +  Psych/Neuro: alert and oriented, pleasant and cooperative    Assessment/Plan-  Hypertension- stable  c/w hctz, hydralazine  Copd-stable  c/w symbicort  c/w albuterol as needed  Epilepsy- no recent seizures  continue supportive care  Schizophrenia- stable  c/w Risperdal, divalproex  c/w Haldol prn  c/w supportive care  Weakness- Fall precautions

## 2025-06-04 ENCOUNTER — NURSING HOME VISIT (OUTPATIENT)
Dept: POST ACUTE CARE | Facility: EXTERNAL LOCATION | Age: 70
End: 2025-06-04
Payer: MEDICARE

## 2025-06-04 DIAGNOSIS — J42 CHRONIC BRONCHITIS, UNSPECIFIED CHRONIC BRONCHITIS TYPE (MULTI): ICD-10-CM

## 2025-06-04 DIAGNOSIS — I10 PRIMARY HYPERTENSION: ICD-10-CM

## 2025-06-04 DIAGNOSIS — G40.909 NONINTRACTABLE EPILEPSY WITHOUT STATUS EPILEPTICUS, UNSPECIFIED EPILEPSY TYPE (MULTI): ICD-10-CM

## 2025-06-04 DIAGNOSIS — F25.9 SCHIZO AFFECTIVE SCHIZOPHRENIA (MULTI): Primary | ICD-10-CM

## 2025-06-04 PROCEDURE — 99308 SBSQ NF CARE LOW MDM 20: CPT | Performed by: INTERNAL MEDICINE

## 2025-06-04 NOTE — PROGRESS NOTES
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 He is sitting in activity room. Appears well and smiling.No concerns per staff. Appetite remains good. no falls.  ROS- denies fever/chills, ha/dizziness, chest pain, denies wheezing, abdominal pain, or swelling  No recent falls.  Objective:  VS:/59 T 96.9 P 77 Wt 198 lbs  Gen NAD, pleasant  Neck: no jvd noted  CV: s1 s2, RRR  Pulmonary: CTA B/L  GI: soft, non tender BS +  Psych/Neuro: alert and oriented, pleasantand cooperative    Assessment/Plan-  Hypertension- stable  c/w hctz, hydralazine  Copd-stable  c/w symbicort  c/w albuterol as needed  Epilepsy- no recent seizures  continue supportive care  Schizophrenia- stable  c/w Risperdal, divalproex  c/w Haldol prn  c/w supportive care  Weakness- Fall precautions

## 2025-06-04 NOTE — LETTER
Patient: Dhruv Elizabeth  : 1955    Encounter Date: 2025    Subjective- Resident seen for routine visit. He is sitting in activity room. Appears well and smiling.No concerns per staff. Appetite remains good. no falls.  ROS- denies fever/chills, ha/dizziness, chest pain, denies wheezing, abdominal pain, or swelling  No recent falls.  Objective:  VS:/59 T 96.9 P 77 Wt 198 lbs  Gen NAD, pleasant  Neck: no jvd noted  CV: s1 s2, RRR  Pulmonary: CTA B/L  GI: soft, non tender BS +  Psych/Neuro: alert and oriented, pleasantand cooperative    Assessment/Plan-  Hypertension- stable  c/w hctz, hydralazine  Copd-stable  c/w symbicort  c/w albuterol as needed  Epilepsy- no recent seizures  continue supportive care  Schizophrenia- stable  c/w Risperdal, divalproex  c/w Haldol prn  c/w supportive care  Weakness- Fall precautions    Electronically Signed By: Jesica Villalpando MD   25  6:49 PM

## 2025-07-30 ENCOUNTER — NURSING HOME VISIT (OUTPATIENT)
Dept: POST ACUTE CARE | Facility: EXTERNAL LOCATION | Age: 70
End: 2025-07-30
Payer: MEDICARE

## 2025-07-30 DIAGNOSIS — I10 PRIMARY HYPERTENSION: ICD-10-CM

## 2025-07-30 DIAGNOSIS — G40.909 NONINTRACTABLE EPILEPSY WITHOUT STATUS EPILEPTICUS, UNSPECIFIED EPILEPSY TYPE (MULTI): ICD-10-CM

## 2025-07-30 DIAGNOSIS — J42 CHRONIC BRONCHITIS, UNSPECIFIED CHRONIC BRONCHITIS TYPE (MULTI): ICD-10-CM

## 2025-07-30 DIAGNOSIS — N40.0 BENIGN PROSTATIC HYPERPLASIA WITHOUT LOWER URINARY TRACT SYMPTOMS: ICD-10-CM

## 2025-07-30 DIAGNOSIS — G40.802 OTHER EPILEPSY WITHOUT STATUS EPILEPTICUS, NOT INTRACTABLE: ICD-10-CM

## 2025-07-30 DIAGNOSIS — F25.9 SCHIZO AFFECTIVE SCHIZOPHRENIA (MULTI): Primary | ICD-10-CM

## 2025-07-30 PROCEDURE — 99308 SBSQ NF CARE LOW MDM 20: CPT | Performed by: INTERNAL MEDICINE

## 2025-07-30 NOTE — LETTER
Patient: Dhruv Elizabeth  : 1955    Encounter Date: 2025    Subjective- Resident seen for routine visit. He is sitting in activity room. Appears well and smiling.No concerns per staff. Appetite remains good. no falls.  ROS- denies fever/chills, ha/dizziness, chest pain, denies wheezing, abdominal pain, or swelling  No recent falls.  Objective:  VS:/59 T 97.1 P 82 Wt 195.8 lbs  Gen NAD, pleasant  Neck: no jvd noted  CV: s1 s2, RRR  Pulmonary: CTA B/L  GI: soft, non tender BS +  Psych/Neuro: alert and oriented, pleasantand cooperative  Assessment/Plan-  Hypertension- stable  c/w hctz, hydralazine  Copd-stable  c/w symbicort  c/w albuterol as needed  Epilepsy- no recent seizures  Schizophrenia- stable  c/w Risperdal, divalproex  c/w Haldol prn  c/w supportive care  Weakness- Fall precautions    Electronically Signed By: Jesica Villalpando MD   25  2:13 PM

## 2025-07-30 NOTE — PROGRESS NOTES
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 He is sitting in activity room. Appears well and smiling.No concerns per staff. Appetite remains good. no falls.  ROS- denies fever/chills, ha/dizziness, chest pain, denies wheezing, abdominal pain, or swelling  No recent falls.  Objective:  VS:/59 T 97.1 P 82 Wt 195.8 lbs  Gen NAD, pleasant  Neck: no jvd noted  CV: s1 s2, RRR  Pulmonary: CTA B/L  GI: soft, non tender BS +  Psych/Neuro: alert and oriented, pleasantand cooperative  Assessment/Plan-  Hypertension- stable  c/w hctz, hydralazine  Copd-stable  c/w symbicort  c/w albuterol as needed  Epilepsy- no recent seizures  Schizophrenia- stable  c/w Risperdal, divalproex  c/w Haldol prn  c/w supportive care  Weakness- Fall precautions